# Patient Record
Sex: FEMALE | Race: WHITE | NOT HISPANIC OR LATINO | URBAN - METROPOLITAN AREA
[De-identification: names, ages, dates, MRNs, and addresses within clinical notes are randomized per-mention and may not be internally consistent; named-entity substitution may affect disease eponyms.]

---

## 2022-04-26 ENCOUNTER — APPOINTMENT (RX ONLY)
Dept: URBAN - METROPOLITAN AREA CLINIC 23 | Facility: CLINIC | Age: 57
Setting detail: DERMATOLOGY
End: 2022-04-26

## 2022-04-26 DIAGNOSIS — L85.3 XEROSIS CUTIS: ICD-10-CM

## 2022-04-26 DIAGNOSIS — L91.0 HYPERTROPHIC SCAR: ICD-10-CM

## 2022-04-26 DIAGNOSIS — Z41.9 ENCOUNTER FOR PROCEDURE FOR PURPOSES OTHER THAN REMEDYING HEALTH STATE, UNSPECIFIED: ICD-10-CM

## 2022-04-26 DIAGNOSIS — Z71.89 OTHER SPECIFIED COUNSELING: ICD-10-CM

## 2022-04-26 DIAGNOSIS — L81.4 OTHER MELANIN HYPERPIGMENTATION: ICD-10-CM

## 2022-04-26 PROCEDURE — ? INTRALESIONAL KENALOG

## 2022-04-26 PROCEDURE — 99213 OFFICE O/P EST LOW 20 MIN: CPT | Mod: 25

## 2022-04-26 PROCEDURE — ? COUNSELING

## 2022-04-26 PROCEDURE — 11900 INJECT SKIN LESIONS </W 7: CPT

## 2022-04-26 PROCEDURE — ? RECOMMENDATIONS

## 2022-04-26 PROCEDURE — ? SUNSCREEN RECOMMENDATIONS

## 2022-04-26 ASSESSMENT — LOCATION DETAILED DESCRIPTION DERM
LOCATION DETAILED: LEFT POSTAURICULAR CREASE
LOCATION DETAILED: RIGHT LATERAL MALAR CHEEK
LOCATION DETAILED: LEFT LATERAL MALAR CHEEK
LOCATION DETAILED: LEFT MID PREAURICULAR CHEEK

## 2022-04-26 ASSESSMENT — LOCATION ZONE DERM
LOCATION ZONE: FACE
LOCATION ZONE: EAR

## 2022-04-26 ASSESSMENT — LOCATION SIMPLE DESCRIPTION DERM
LOCATION SIMPLE: RIGHT CHEEK
LOCATION SIMPLE: LEFT EAR
LOCATION SIMPLE: LEFT CHEEK

## 2022-04-26 NOTE — PROCEDURE: RECOMMENDATIONS
Detail Level: Zone
Recommendations (Free Text): Ematrix $500 neck \\nProfound $3500
Render Risk Assessment In Note?: no

## 2022-04-26 NOTE — PROCEDURE: INTRALESIONAL KENALOG
Lot # (Optional): [id number]
Medical Necessity Clause: This procedure was medically necessary because the lesions that were treated were:
X Size Of Lesion In Cm (Optional): 0
Total Volume Injected (Ccs- Only Use Numbers And Decimals): 0.5
Concentration Of Solution Injected (Mg/Ml): 40.0
Consent: The risks of atrophy were reviewed with the patient.
Ndc# For Kenalog Only: [id number]
Treatment Number (Optional): 1
Detail Level: Detailed
Kenalog Preparation: Kenalog
Administered By (Optional): Dr. Man Jones
Validate Note Data When Using Inventory: Yes
Expiration Date (Optional): DEC 2022
Include Z78.9 (Other Specified Conditions Influencing Health Status) As An Associated Diagnosis?: No

## 2022-06-10 ENCOUNTER — RX ONLY (OUTPATIENT)
Age: 57
Setting detail: RX ONLY
End: 2022-06-10

## 2022-06-10 RX ORDER — VALACYCLOVIR HYDROCHLORIDE 500 MG/1
TABLET, FILM COATED ORAL
Qty: 9 | Refills: 3 | Status: ERX | COMMUNITY
Start: 2022-06-10

## 2022-06-14 ENCOUNTER — APPOINTMENT (RX ONLY)
Dept: URBAN - METROPOLITAN AREA CLINIC 23 | Facility: CLINIC | Age: 57
Setting detail: DERMATOLOGY
End: 2022-06-14

## 2022-06-14 DIAGNOSIS — Z41.9 ENCOUNTER FOR PROCEDURE FOR PURPOSES OTHER THAN REMEDYING HEALTH STATE, UNSPECIFIED: ICD-10-CM

## 2022-06-14 PROCEDURE — ? INVENTORY

## 2022-06-14 PROCEDURE — ? PROFOUND

## 2022-06-14 ASSESSMENT — LOCATION ZONE DERM: LOCATION ZONE: NECK

## 2022-06-14 ASSESSMENT — LOCATION DETAILED DESCRIPTION DERM: LOCATION DETAILED: LEFT INFERIOR ANTERIOR NECK

## 2022-06-14 ASSESSMENT — LOCATION SIMPLE DESCRIPTION DERM: LOCATION SIMPLE: LEFT ANTERIOR NECK

## 2022-06-14 NOTE — PROCEDURE: PROFOUND
Time Duration (Optional): 3.0
Length Topical Anesthesia Applied (Optional): 20 minutes
Spacinmm
Temperature (Optional): 67c
Spacing (Right Face): 2mm
Time Duration (Optional): 4 sec
Topical Anesthesia?: 20% benzocaine, 8% lidocaine, and 8% tetracaine
Consent: Written consent obtained, risks reviewed including but not limited to crusting, scabbing, blistering, scarring, darker or lighter pigmentary change, and/or incomplete improvement.
Anesthesia Volume In Cc: 10
Time Duration (Optional): 3
Pre-Procedure Text: After consent was obtained the treatment areas were cleaned and treated using the parameters mentioned above.
Time Duration (Optional): 3ms
External Cooling Fan Speed: 5
Total Insertions (Required): 100
Treatment Number: 1
Location: right thigh
Detail Level: Detailed
Location: behind left thigh
Temperature (Optional): 6640 [facility]
Local Anesthesia: 0.1% lidocaine with 1:1,000,000 epinephrine (tumescent anesthesia)
Pre-Treatment Photos?: Yes
Post-Procedure Text: Following the procedure, post care was reviewed with the patient. Recommended Alastin skin nectar and arnica topical and tablets fro swelling and bruising.
Device Serial Number (Optional): Dermal : LOT# [id number] Exp: 2023-01-02
Temperature (Optional): 67
Post-Care Instructions: I reviewed with the patient in detail post-care instructions. Patient should stay away from the sun and wear sun protection until fully healed.
Temperature (Optional): [place]

## 2022-06-15 ENCOUNTER — APPOINTMENT (RX ONLY)
Dept: URBAN - METROPOLITAN AREA CLINIC 23 | Facility: CLINIC | Age: 57
Setting detail: DERMATOLOGY
End: 2022-06-15

## 2022-06-15 DIAGNOSIS — Z41.9 ENCOUNTER FOR PROCEDURE FOR PURPOSES OTHER THAN REMEDYING HEALTH STATE, UNSPECIFIED: ICD-10-CM

## 2022-06-15 PROCEDURE — ? PULSED-DYE LASER

## 2022-06-15 NOTE — PROCEDURE: PULSED-DYE LASER
Pulse Duration: 10 ms
Location (Required For Details To Render In Note But Body Touch Will Also Count For First Location): neck
Spot Size: 7 mm
Cryogen Time (Ms): [address]65 [address]
Laser Type: Vbeam 595nm
Delay Time (Ms): 8155 [facility]
Cryogen Time (Ms): 30
Delay Time (Ms): 20
Treated Area: small area
Consent: Written consent obtained, risks reviewed including but not limited to crusting, scabbing, blistering, scarring, darker or lighter pigmentary change, incidental hair removal, bruising, and/or incomplete removal.
Spot Size: 10 mm
Fluence In J/Cm2 (Optional): 6.5 j/cm2
Pulse Duration: 6 ms
Post-Procedure Care: Vaseline and ice applied. Post care reviewed with patient.
Detail Level: Zone
Post-Care Instructions: I reviewed with the patient in detail post-care instructions. Patient should stay away from the sun and wear sun protection until treated areas are fully healed.
Fluence In J/Cm2 (Optional): 6.5  j/cm2

## 2022-06-16 ENCOUNTER — APPOINTMENT (RX ONLY)
Dept: URBAN - METROPOLITAN AREA CLINIC 23 | Facility: CLINIC | Age: 57
Setting detail: DERMATOLOGY
End: 2022-06-16

## 2022-06-16 DIAGNOSIS — Z41.9 ENCOUNTER FOR PROCEDURE FOR PURPOSES OTHER THAN REMEDYING HEALTH STATE, UNSPECIFIED: ICD-10-CM

## 2022-06-16 PROCEDURE — ? PULSED-DYE LASER

## 2022-06-16 NOTE — PROCEDURE: PULSED-DYE LASER
Spot Size: 7 mm
Pulse Duration: 6 ms
Cryogen Time (Ms): 51536 [address]
Pulse Duration: 10 ms
Detail Level: Zone
Delay Time (Ms): 1530 [facility]
Cryogen Time (Ms): 30
Treated Area: small area
Post-Procedure Care: Vaseline and ice applied. Post care reviewed with patient.
Consent: Written consent obtained, risks reviewed including but not limited to crusting, scabbing, blistering, scarring, darker or lighter pigmentary change, incidental hair removal, bruising, and/or incomplete removal.
Spot Size: 10 mm
Delay Time (Ms): 20
Laser Type: Vbeam 595nm
Fluence In J/Cm2 (Optional): 6.5  j/cm2
Post-Care Instructions: I reviewed with the patient in detail post-care instructions. Patient should stay away from the sun and wear sun protection until treated areas are fully healed.
Fluence In J/Cm2 (Optional): 6.5 j/cm2
Location (Required For Details To Render In Note But Body Touch Will Also Count For First Location): neck

## 2022-06-17 ENCOUNTER — APPOINTMENT (RX ONLY)
Dept: URBAN - METROPOLITAN AREA CLINIC 23 | Facility: CLINIC | Age: 57
Setting detail: DERMATOLOGY
End: 2022-06-17

## 2022-06-17 DIAGNOSIS — Z41.9 ENCOUNTER FOR PROCEDURE FOR PURPOSES OTHER THAN REMEDYING HEALTH STATE, UNSPECIFIED: ICD-10-CM

## 2022-06-17 PROCEDURE — ? PULSED-DYE LASER

## 2022-06-17 NOTE — PROCEDURE: PULSED-DYE LASER
Cryogen Time (Ms): 97705 [address]
Laser Type: Vbeam 595nm
Consent: Written consent obtained, risks reviewed including but not limited to crusting, scabbing, blistering, scarring, darker or lighter pigmentary change, incidental hair removal, bruising, and/or incomplete removal.
Cryogen Time (Ms): 30
Post-Care Instructions: I reviewed with the patient in detail post-care instructions. Patient should stay away from the sun and wear sun protection until treated areas are fully healed.
Treated Area: small area
Treated Area: large area
Detail Level: Detailed
Spot Size: 10 mm
Spot Size: 7 mm
Delay Time (Ms): 3328 [facility]
Delay Time (Ms): [address]
Location Override: post profound
Fluence In J/Cm2 (Optional): 4.00
Spot Size: 10x3 mm
Delay Time (Ms): 10
Immediate Endpoint: erythema
Delay Time (Ms): 20
Pulse Duration: .45 ms
Fluence In J/Cm2 (Optional): 6.25
Pulse Duration: 10 ms
Pulse Duration: 6 ms

## 2022-08-31 ENCOUNTER — APPOINTMENT (RX ONLY)
Dept: URBAN - METROPOLITAN AREA CLINIC 23 | Facility: CLINIC | Age: 57
Setting detail: DERMATOLOGY
End: 2022-08-31

## 2022-08-31 DIAGNOSIS — Z41.9 ENCOUNTER FOR PROCEDURE FOR PURPOSES OTHER THAN REMEDYING HEALTH STATE, UNSPECIFIED: ICD-10-CM

## 2022-08-31 PROCEDURE — ? CANDELA NORDLYS

## 2022-08-31 NOTE — PROCEDURE: CANDELA NORDLYS
Fluence In J/Cm2: 100
Treatment Number (Will Not Render If Zero): 0
Price (Use Numbers Only, No Special Characters Or $): [address]
Applicator:  (600-950nm)
Hsv Prophylaxis: no
Fluence In J/Cm2(Optional): 13.9  j/cm2
Modality: SWT
Consent: Written consent obtained, risks reviewed including but not limited to crusting, scabbing, blistering, scarring, darker or lighter pigmentary change, and/or incomplete removal.
Pulse Time In Ms (Will Not Render If Zero): 3147 [facility]
Length Topical Anesthesia Applied (Optional): 10 minutes
Hsv Prophylaxis Prescription: Valtrex 500mg BID starting the day of procedures and continuing until all sites healed
Post-Care Instructions: I reviewed with the patient in detail post-care instructions.
Eye Shielding Text (Leave Blank If Unwanted- Will Be Inserted If Selecting Eye Shields): The intraocular eye shields were placed. 2 drops of intraocular tetracaine HCL ophthalmic 0.5% solution was administered. The eye shields were coated with ophthalmic bacitracin prior to insertion. After the shields were removed the eyes were flushed with normal saline.
Passes (Will Not Render If Zero): 1
Location: buttocks
Detail Level: Zone

## 2022-10-07 ENCOUNTER — APPOINTMENT (RX ONLY)
Dept: URBAN - METROPOLITAN AREA CLINIC 23 | Facility: CLINIC | Age: 57
Setting detail: DERMATOLOGY
End: 2022-10-07

## 2022-10-07 DIAGNOSIS — Z41.9 ENCOUNTER FOR PROCEDURE FOR PURPOSES OTHER THAN REMEDYING HEALTH STATE, UNSPECIFIED: ICD-10-CM

## 2022-10-07 PROCEDURE — ? CANDELA NORDLYS

## 2022-10-07 NOTE — PROCEDURE: CANDELA NORDLYS
Pulse Duration In Ms (Will Not Render If Zero): 0
Detail Level: Zone
Modality: SWT
Applicator:  (600-950nm)
Eye Shielding Text (Leave Blank If Unwanted- Will Be Inserted If Selecting Eye Shields): The intraocular eye shields were placed. 2 drops of intraocular tetracaine HCL ophthalmic 0.5% solution was administered. The eye shields were coated with ophthalmic bacitracin prior to insertion. After the shields were removed the eyes were flushed with normal saline.
Price (Use Numbers Only, No Special Characters Or $): [address]
Fluence In J/Cm2(Optional): 14.1 j/cm2
Pulse Time In Ms (Will Not Render If Zero): 3973 [facility]
Hsv Prophylaxis: no
Fluence In J/Cm2: 100
Length Topical Anesthesia Applied (Optional): 10 minutes
Hsv Prophylaxis Prescription: Valtrex 500mg BID starting the day of procedures and continuing until all sites healed
Passes (Will Not Render If Zero): 1
Treatment Number (Will Not Render If Zero): 2
Consent: Written consent obtained, risks reviewed including but not limited to crusting, scabbing, blistering, scarring, darker or lighter pigmentary change, and/or incomplete removal.
Post-Care Instructions: I reviewed with the patient in detail post-care instructions.

## 2022-10-17 ENCOUNTER — APPOINTMENT (RX ONLY)
Dept: URBAN - METROPOLITAN AREA CLINIC 23 | Facility: CLINIC | Age: 57
Setting detail: DERMATOLOGY
End: 2022-10-17

## 2022-10-17 DIAGNOSIS — Z41.9 ENCOUNTER FOR PROCEDURE FOR PURPOSES OTHER THAN REMEDYING HEALTH STATE, UNSPECIFIED: ICD-10-CM

## 2022-10-17 PROCEDURE — ? EMATRIX

## 2022-10-17 ASSESSMENT — LOCATION SIMPLE DESCRIPTION DERM: LOCATION SIMPLE: LEFT ANTERIOR NECK

## 2022-10-17 ASSESSMENT — LOCATION ZONE DERM: LOCATION ZONE: NECK

## 2022-10-17 ASSESSMENT — LOCATION DETAILED DESCRIPTION DERM: LOCATION DETAILED: LEFT INFERIOR ANTERIOR NECK

## 2022-10-17 NOTE — PROCEDURE: EMATRIX
Price (Use Numbers Only, No Special Characters Or $): [address]
Use Hsv Prophylaxis: Yes
Hsv Prophylaxis: valtrex 500mg
Pulses (Optional): [address] [facility]
Pre-Procedure: Prior to the procedure all persons present in the exam put on protective eyewear.
Endpoint: Immediate endpoint: perifollicular erythema and edema. Vaseline and ice applied. Post care reviewed with patient.
Fluence: 59 J
Post-Care Instructions: Treatment was not performed today because patient will not be able to avoid sun exposure this weekend.  Mey Hart
Passes (Optional): 1
Program: IZZY
Detail Level: Zone
Consent: Written consent obtained, risks reviewed including but not limited to crusting, scabbing, blistering, scarring, darker or lighter pigmentary change, paradoxical hair regrowth, incomplete removal of hair and infection.

## 2022-11-07 ENCOUNTER — APPOINTMENT (RX ONLY)
Dept: URBAN - METROPOLITAN AREA CLINIC 23 | Facility: CLINIC | Age: 57
Setting detail: DERMATOLOGY
End: 2022-11-07

## 2022-11-07 DIAGNOSIS — Z41.9 ENCOUNTER FOR PROCEDURE FOR PURPOSES OTHER THAN REMEDYING HEALTH STATE, UNSPECIFIED: ICD-10-CM

## 2022-11-07 PROCEDURE — ? EMATRIX

## 2022-11-07 ASSESSMENT — LOCATION ZONE DERM: LOCATION ZONE: NECK

## 2022-11-07 ASSESSMENT — LOCATION DETAILED DESCRIPTION DERM: LOCATION DETAILED: LEFT INFERIOR ANTERIOR NECK

## 2022-11-07 ASSESSMENT — LOCATION SIMPLE DESCRIPTION DERM: LOCATION SIMPLE: LEFT ANTERIOR NECK

## 2022-11-07 NOTE — PROCEDURE: EMATRIX
Passes (Optional): 1
Endpoint: Immediate endpoint: perifollicular erythema and edema. Vaseline and ice applied. Post care reviewed with patient.
Fluence: 62 J
Post-Care Instructions: Treatment was not performed today because patient will not be able to avoid sun exposure this weekend.  Jamie Rocha
Pre-Procedure: Prior to the procedure all persons present in the exam put on protective eyewear.
Use Hsv Prophylaxis: No
Hsv Prophylaxis: valtrex 500mg
Program: IZZY
Pulses (Optional): [address]
Price (Use Numbers Only, No Special Characters Or $): [address]
Consent: Written consent obtained, risks reviewed including but not limited to crusting, scabbing, blistering, scarring, darker or lighter pigmentary change, paradoxical hair regrowth, incomplete removal of hair and infection.
Detail Level: Zone

## 2024-03-27 ENCOUNTER — APPOINTMENT (RX ONLY)
Dept: URBAN - METROPOLITAN AREA CLINIC 23 | Facility: CLINIC | Age: 59
Setting detail: DERMATOLOGY
End: 2024-03-27

## 2024-03-27 DIAGNOSIS — Z41.9 ENCOUNTER FOR PROCEDURE FOR PURPOSES OTHER THAN REMEDYING HEALTH STATE, UNSPECIFIED: ICD-10-CM

## 2024-03-27 PROCEDURE — ? RECOMMENDATIONS

## 2024-03-27 PROCEDURE — ? BOTOX

## 2024-03-27 NOTE — PROCEDURE: MIPS QUALITY
Name And Contact Information For Health Care Proxy: Missy Lee [phone]
Detail Level: Detailed
Quality 226: Preventive Care And Screening: Tobacco Use: Screening And Cessation Intervention: Patient screened for tobacco use and is an ex/non-smoker

## 2024-03-27 NOTE — PROCEDURE: BOTOX
Show Orbicularis Oculi Units: Yes
Masseter Units: 0
Expiration Date (Month Year): 2024/05
Additional Area 5 Location: axillas
Additional Area 4 Location: lateral brows
Additional Area 1 Units: 4
Show Ucl Units: No
Additional Area 3 Location: touch up
Detail Level: Detailed
Lot #: [id number]
Additional Area 2 Location: high forehead
Incrementing Botox Units: By 0.5 Units
Show Inventory Tab: Show
Consent: Written consent obtained. Risks include but not limited to lid/brow ptosis, bruising, swelling, diplopia, temporary effect, incomplete chemical denervation.
Periorbital Skin Units: 21
Additional Area 6 Location: yelena gomez
Post-Care Instructions: Patient instructed to not lie down for 4 hours and limit physical activity for 24 hours. Patient instructed not to travel by airplane for 48 hours.
Dilution (U/0.1 Cc): 2.5
Additional Area 1 Location: lat brows

## 2024-03-27 NOTE — PROCEDURE: RECOMMENDATIONS
Recommendations (Free Text): Revox, Alastin,Retinol, discussed softwave forehead brows
Detail Level: Detailed
Render Risk Assessment In Note?: no

## 2024-09-25 ENCOUNTER — APPOINTMENT (RX ONLY)
Dept: URBAN - METROPOLITAN AREA CLINIC 23 | Facility: CLINIC | Age: 59
Setting detail: DERMATOLOGY
End: 2024-09-25

## 2024-09-25 DIAGNOSIS — Z41.9 ENCOUNTER FOR PROCEDURE FOR PURPOSES OTHER THAN REMEDYING HEALTH STATE, UNSPECIFIED: ICD-10-CM

## 2024-09-25 PROCEDURE — ? BOTOX

## 2024-09-25 PROCEDURE — ? RECOMMENDATIONS

## 2024-09-25 NOTE — PROCEDURE: RECOMMENDATIONS
Render Risk Assessment In Note?: no
Detail Level: Zone
Recommendations (Free Text): Sofwave for forehead and neck with Nissa, quoted patient $1500 per treatment for forehead and $2500 per treatment for neck

## 2024-09-25 NOTE — PROCEDURE: BOTOX
Inferior Lateral Orbicularis Oculi Units: 0
Show Ucl Units: No
Show Depressor Anguli Units: Yes
Additional Area 3 Location: neck bands
Additional Area 2 Location: lateral brows
Consent: Written consent obtained. Risks include but not limited to lid/brow ptosis, bruising, swelling, diplopia, temporary effect, incomplete chemical denervation.
Lot #: [id number]
Show Inventory Tab: Show
Post-Care Instructions: Patient instructed to not lie down for 4 hours and limit physical activity for 24 hours. Patient instructed not to travel by airplane for 48 hours.
Additional Area 6 Location: yelena gomez
Expiration Date (Month Year): 2026/09
Periorbital Skin Units: 21
Incrementing Botox Units: By 0.5 Units
Dilution (U/0.1 Cc): 1.2
Additional Area 1 Units: 4
Additional Area 5 Location: axillas
Additional Area 4 Location: trapezoids
Detail Level: Detailed

## 2024-10-22 ENCOUNTER — APPOINTMENT (RX ONLY)
Dept: URBAN - METROPOLITAN AREA CLINIC 23 | Facility: CLINIC | Age: 59
Setting detail: DERMATOLOGY
End: 2024-10-22

## 2024-10-22 DIAGNOSIS — Z41.9 ENCOUNTER FOR PROCEDURE FOR PURPOSES OTHER THAN REMEDYING HEALTH STATE, UNSPECIFIED: ICD-10-CM

## 2024-10-22 PROCEDURE — ? BOTOX

## 2024-10-22 NOTE — PROCEDURE: BOTOX
Lateral Platysmal Bands Units: 0
Show Lcl Units: No
Show Topical Anesthesia: Yes
Show Inventory Tab: Hide
Incrementing Botox Units: By 0.5 Units
Additional Area 3 Location: neck bands
Lot #: [id number]
Expiration Date (Month Year): 2026/09
Additional Area 2 Location: right brow touch up
Additional Area 6 Location: chin
Additional Area 2 Units: 2
Dilution (U/0.1 Cc): 1.2
Consent: Written consent obtained. Risks include but not limited to lid/brow ptosis, bruising, swelling, diplopia, temporary effect, incomplete chemical denervation.
Additional Area 5 Location: axillas
Additional Area 1 Location: touch up forehead
Additional Area 1 Units: 6
Additional Area 4 Location: trapezoids
Detail Level: Detailed
Post-Care Instructions: Patient instructed to not lie down for 4 hours and limit physical activity for 24 hours. Patient instructed not to travel by airplane for 48 hours.